# Patient Record
Sex: MALE | Race: OTHER | NOT HISPANIC OR LATINO | URBAN - NONMETROPOLITAN AREA
[De-identification: names, ages, dates, MRNs, and addresses within clinical notes are randomized per-mention and may not be internally consistent; named-entity substitution may affect disease eponyms.]

---

## 2021-02-02 ENCOUNTER — IMPORTED ENCOUNTER (OUTPATIENT)
Dept: URBAN - NONMETROPOLITAN AREA CLINIC 1 | Facility: CLINIC | Age: 51
End: 2021-02-02

## 2021-02-02 PROBLEM — H52.4: Noted: 2021-02-02

## 2021-02-02 PROBLEM — H25.813: Noted: 2021-02-02

## 2021-02-02 PROCEDURE — S0620 ROUTINE OPHTHALMOLOGICAL EXA: HCPCS

## 2021-02-02 NOTE — PATIENT DISCUSSION
Presbyopia OUDiscussed refractive status in detail with patient. New glasses Rx given today. Continue to monitor. Combined Cataracts OUDiscussed diagnosis with patient. Reviewed symptoms related to cataract progression. Discussed various treatment options with patient. Recommend cataract evaluation by Dr. Wilber Macedo. Patient elects to schedule cat sean. Continue to monitor.

## 2021-11-03 ENCOUNTER — IMPORTED ENCOUNTER (OUTPATIENT)
Dept: URBAN - NONMETROPOLITAN AREA CLINIC 1 | Facility: CLINIC | Age: 51
End: 2021-11-03

## 2021-11-03 PROBLEM — H52.4: Noted: 2021-11-03

## 2021-11-03 PROBLEM — H25.813: Noted: 2021-11-03

## 2021-11-03 PROCEDURE — 92014 COMPRE OPH EXAM EST PT 1/>: CPT

## 2021-11-03 NOTE — PATIENT DISCUSSION
Cataract(s)-Visually significant cataract OU .-Cataract(s) causing symptomatic impairment of visual function not correctable with a tolerable change in glasses or contact lenses lighting or non-operative means resulting in specific activity limitations and/or participation restrictions including but not limited to reading viewing television driving or meeting vocational or recreational needs. -Expectation is clearer vision and functional improvement in symptoms as well as reduced glare disability after cataract removal.-Order IOLMaster and OPD today. -Recommend    Limbal Relaxing Incisions based on today's OPD testing and lifestyle questionnaire.-All questions were answered regarding surgery including pre and post-op medications appointments activity restrictions and anesthetic usage.-The risks benefits and alternatives and special risk factors for the patient were discussed in detail including but not limited to: bleeding infection retinal detachment vitreous loss problems with the implant and possible need for additional surgery.-Although rare the possibility of complete vision loss was discussed.-The possible need for glasses post-operatively was discussed.-Order medical clearance exam based on history of high cholesterol & hypertension -Patient elects to proceed with cataract surgery OS . Will schedule at patient's convenience and re-evaluate OD  in the future. Pt qualifies for LRI OU discussed benefits & advsied pt the need for reading gls. Pt elects LenSx OU.  Start AT & Lotemax TID OU x 1 week and RTC for repeat dyana ou

## 2022-01-04 ENCOUNTER — IMPORTED ENCOUNTER (OUTPATIENT)
Dept: URBAN - NONMETROPOLITAN AREA CLINIC 1 | Facility: CLINIC | Age: 52
End: 2022-01-04

## 2022-01-04 PROBLEM — I10: Noted: 2022-01-04

## 2022-01-04 PROBLEM — E78.5: Noted: 2022-01-04

## 2022-01-04 PROBLEM — Z01.818: Noted: 2022-01-04

## 2022-01-04 PROCEDURE — 99212 OFFICE O/P EST SF 10 MIN: CPT

## 2022-01-11 ENCOUNTER — IMPORTED ENCOUNTER (OUTPATIENT)
Dept: URBAN - NONMETROPOLITAN AREA CLINIC 1 | Facility: CLINIC | Age: 52
End: 2022-01-11

## 2022-01-11 PROBLEM — H25.811: Noted: 2022-01-11

## 2022-01-11 PROBLEM — Z98.42: Noted: 2022-01-11

## 2022-01-11 PROCEDURE — 92136 OPHTHALMIC BIOMETRY: CPT

## 2022-01-11 PROCEDURE — 66984 XCAPSL CTRC RMVL W/O ECP: CPT

## 2022-01-11 PROCEDURE — 99024 POSTOP FOLLOW-UP VISIT: CPT

## 2022-01-11 NOTE — PATIENT DISCUSSION
Cataract(s)-Visually significant cataract OD-Cataract(s) causing symptomatic impairment of visual function not correctable with a tolerable change in glasses or contact lenses lighting or non-operative means resulting in specific activity limitations and/or participation restrictions including but not limited to reading viewing television driving or meeting vocational or recreational needs. -Expectation is clearer vision and functional improvement in symptoms as well as reduced glare disability after cataract removal.-Recommend Stand/Trad  based on previous OPD testing and lifestyle questionnaire.-All questions were answered regarding surgery including pre and post-op medications appointments activity restrictions and anesthetic usage.-The risks benefits and alternatives and special risk factors for the patient were discussed in detail including but not limited to: bleeding infection retinal detachment vitreous loss problems with the implant and possible need for additional surgery.-Although rare the possibility of complete vision loss was discussed.-The need for glasses post-operatively was discussed.-Patient elects to proceed with cataract surgery OD . Will schedule at patient's convenience. s/p PCIOL CE OS Stand/Trad -Pt doing well s/p PCIOL. -Continue post-op gtts according to instruction sheet and sleep with eye shield over eye for 7 nights.-Avoid bending at the waist lifting anything over 5lbs and dirty or gabrielle environments. -RTC .

## 2022-01-19 ENCOUNTER — PREPPED CHART (OUTPATIENT)
Dept: URBAN - NONMETROPOLITAN AREA CLINIC 1 | Facility: CLINIC | Age: 52
End: 2022-01-19

## 2022-01-19 NOTE — PATIENT DISCUSSION
Discussed diagnosis in detail with patient. Patient is stable and doing well. Wound intact. Continue all post op drops as directed. Continue to monitor.

## 2022-01-21 ASSESSMENT — VISUAL ACUITY
OD_PH: 20/25-1
OS_PH: 20/40-1
OD_SC: 20/40
OS_SC: 20/50

## 2022-01-21 ASSESSMENT — TONOMETRY
OD_IOP_MMHG: 18
OS_IOP_MMHG: 20

## 2022-01-24 ENCOUNTER — POST-OP (OUTPATIENT)
Dept: URBAN - NONMETROPOLITAN AREA CLINIC 1 | Facility: CLINIC | Age: 52
End: 2022-01-24

## 2022-01-24 DIAGNOSIS — Z96.1: ICD-10-CM

## 2022-01-24 PROCEDURE — 99024 POSTOP FOLLOW-UP VISIT: CPT

## 2022-01-24 ASSESSMENT — VISUAL ACUITY
OD_SC: 20/29
OS_SC: 20/29+

## 2022-01-24 ASSESSMENT — TONOMETRY: OS_IOP_MMHG: 18

## 2022-01-24 NOTE — PATIENT DISCUSSION
Discussed diagnosis in detail with patient. Patient is stable and doing well. Wound intact. Continue all post op drops as directed. BCL removed today without complications. Continue to monitor.

## 2022-04-09 ASSESSMENT — TONOMETRY
OS_IOP_MMHG: 17
OD_IOP_MMHG: 17
OD_IOP_MMHG: 17
OS_IOP_MMHG: 19
OD_IOP_MMHG: 19
OS_IOP_MMHG: 16

## 2022-04-09 ASSESSMENT — VISUAL ACUITY
OD_PAM: 20/20
OD_CC: 20/25+
OS_AM: 20/20
OD_SC: 20/20
OS_SC: 20/50
OS_SC: 20/50
OS_AM: 20/20
OD_CC: 20/25-2
OD_GLARE: 20/40
OD_PAM: 20/20
OD_GLARE: 20/40
OD_GLARE: 20/40
OS_CC: 20/70
OS_PH: 20/30
OS_PH: 20/32
OD_PAM: 20/20
OS_CC: 20/60
OS_CC: 20/40-
OD_SC: 20/25-2